# Patient Record
Sex: FEMALE | Race: WHITE | NOT HISPANIC OR LATINO | Employment: FULL TIME | URBAN - METROPOLITAN AREA
[De-identification: names, ages, dates, MRNs, and addresses within clinical notes are randomized per-mention and may not be internally consistent; named-entity substitution may affect disease eponyms.]

---

## 2019-10-02 ENCOUNTER — OFFICE VISIT (OUTPATIENT)
Dept: ENDOCRINOLOGY | Facility: MEDICAL CENTER | Age: 74
End: 2019-10-02
Payer: MEDICARE

## 2019-10-02 ENCOUNTER — HOSPITAL ENCOUNTER (OUTPATIENT)
Dept: LAB | Facility: MEDICAL CENTER | Age: 74
End: 2019-10-02
Attending: INTERNAL MEDICINE
Payer: MEDICARE

## 2019-10-02 VITALS
SYSTOLIC BLOOD PRESSURE: 120 MMHG | OXYGEN SATURATION: 100 % | HEART RATE: 80 BPM | DIASTOLIC BLOOD PRESSURE: 70 MMHG | HEIGHT: 63 IN | WEIGHT: 137 LBS | BODY MASS INDEX: 24.27 KG/M2

## 2019-10-02 DIAGNOSIS — R79.89 ABNORMAL THYROID BLOOD TEST: ICD-10-CM

## 2019-10-02 DIAGNOSIS — Z86.39 HISTORY OF VITAMIN D DEFICIENCY: ICD-10-CM

## 2019-10-02 DIAGNOSIS — E04.2 MULTIPLE THYROID NODULES: ICD-10-CM

## 2019-10-02 LAB
ALBUMIN SERPL BCP-MCNC: 4.6 G/DL (ref 3.2–4.9)
ALBUMIN/GLOB SERPL: 2.2 G/DL
ALP SERPL-CCNC: 54 U/L (ref 30–99)
ALT SERPL-CCNC: 22 U/L (ref 2–50)
ANION GAP SERPL CALC-SCNC: 7 MMOL/L (ref 0–11.9)
AST SERPL-CCNC: 24 U/L (ref 12–45)
BILIRUB SERPL-MCNC: 0.5 MG/DL (ref 0.1–1.5)
BUN SERPL-MCNC: 16 MG/DL (ref 8–22)
CALCIUM SERPL-MCNC: 8.9 MG/DL (ref 8.5–10.5)
CHLORIDE SERPL-SCNC: 104 MMOL/L (ref 96–112)
CO2 SERPL-SCNC: 27 MMOL/L (ref 20–33)
CREAT SERPL-MCNC: 0.76 MG/DL (ref 0.5–1.4)
GLOBULIN SER CALC-MCNC: 2.1 G/DL (ref 1.9–3.5)
GLUCOSE SERPL-MCNC: 79 MG/DL (ref 65–99)
POTASSIUM SERPL-SCNC: 3.9 MMOL/L (ref 3.6–5.5)
PROT SERPL-MCNC: 6.7 G/DL (ref 6–8.2)
SODIUM SERPL-SCNC: 138 MMOL/L (ref 135–145)

## 2019-10-02 PROCEDURE — 84481 FREE ASSAY (FT-3): CPT

## 2019-10-02 PROCEDURE — 83520 IMMUNOASSAY QUANT NOS NONAB: CPT

## 2019-10-02 PROCEDURE — 80053 COMPREHEN METABOLIC PANEL: CPT

## 2019-10-02 PROCEDURE — 84445 ASSAY OF TSI GLOBULIN: CPT

## 2019-10-02 PROCEDURE — 84443 ASSAY THYROID STIM HORMONE: CPT

## 2019-10-02 PROCEDURE — 36415 COLL VENOUS BLD VENIPUNCTURE: CPT

## 2019-10-02 PROCEDURE — 84439 ASSAY OF FREE THYROXINE: CPT

## 2019-10-02 PROCEDURE — 99204 OFFICE O/P NEW MOD 45 MIN: CPT | Performed by: INTERNAL MEDICINE

## 2019-10-02 RX ORDER — PANTOPRAZOLE SODIUM 40 MG/1
40 TABLET, DELAYED RELEASE ORAL DAILY
COMMUNITY
End: 2024-02-23

## 2019-10-02 RX ORDER — PRAVASTATIN SODIUM 20 MG
20 TABLET ORAL NIGHTLY
COMMUNITY

## 2019-10-02 SDOH — HEALTH STABILITY: MENTAL HEALTH: HOW OFTEN DO YOU HAVE A DRINK CONTAINING ALCOHOL?: 4 OR MORE TIMES A WEEK

## 2019-10-02 NOTE — PATIENT INSTRUCTIONS
Notify me for any new enlargement or growth in neck area, difficulty swallowing, or breathing.

## 2019-10-02 NOTE — PROGRESS NOTES
Chief Complaint: Consult requested by Bryan Pantoja DO for evaluation of abnormal thyroid function tests and history of multiple thyroid nodules and history of vitamin D deficiency    HPI:     Daria Yanes is a 74 y.o. female with history of the above medical issues.  She was discovered to have abnormal thyroid function tests on routine lab work.  Her TSH was normal 2.54 and free T4 was normal at 0.96 but her free T3 was elevated at 5.4 on June 24, 2019.  Her thyroid function tests have not been repeated since that time.  She denies symptoms of hyperthyroidism however such as unexplained weight loss, tremors, palpitations, insomnia, diarrhea and heat intolerance.    Her primary care physician back then Dr. Demetria Duncan,  after discovering the abnormal blood test ordered additional work-up and schedule her for a thyroid ultrasound    Her ultrasound showed 2 nonsuspicious and benign-appearing subcentimeter nodules both measuring 6 mm located on the left lower lobe.  She denies a family history of thyroid cancer and denies a history of radiation exposure to the head and neck.    Aside from the abnormal thyroid function tests and abnormal thyroid ultrasound she has a history of vitamin D deficiency and she is taking unrecalled supplements and her last 25 hydroxy vitamin D level was adequate at 35 on June 2019.    Unfortunately she informed me today that she has a new primary care physician Dr. Pantoja.  Her previous primary care physician has closed down her practice.    Patient's medications, allergies, and social histories were reviewed and updated as appropriate.      ROS:     CONS:     No fever, no chills, no weight loss, no fatigue   EYES:      No diplopia, no blurry vision, no redness of eyes, no swelling of eyelids   ENT:    No hearing loss, No ear pain, No sore throat, no dysphagia, no neck swelling   CV:     No chest pain, no palpitations, no claudication, no orthopnea, no PND   PULM:    No SOB, no cough, no  hemoptysis, no wheezing    GI:   No nausea, no vomiting, no diarrhea, no constipation, no bloody stools   :  Passing urine well, no dysuria, no hematuria   ENDO:   No polyuria, no polydipsia, no heat intolerance, no cold intolerance   NEURO: No headaches, no dizziness, no convulsions, no tremors   MUSC:  No joint swellings, no arthralgias, no myalgias, no weakness   SKIN:   No rash, no ulcers, no dry skin   PSYCH:   No depression, no anxiety, no difficulty sleeping       Past Medical History:  There are no active problems to display for this patient.      Past Surgical History:  Past Surgical History:   Procedure Laterality Date   • PIP ARTHRODESIS  9/25/08    Performed by LIZZY RODRIGUEZ at [facility] ORS   • MASS EXCISION ORTHO  9/25/08    Performed by LIZZY RODRIGUEZ at [facility] ORS   • JOINT INJECTION DIAGNOSTIC  9/25/08    Performed by LIZZY RODRIGUEZ at [facility] ORS   • ABDOMINAL HYSTERECTOMY TOTAL     • APPENDECTOMY     • EYE SURGERY     • GYN SURGERY      hysterectomy   • OTHER      appendectomy, tonsilectomy        Allergies:  Penicillins and Tape     Current Medications:    Current Outpatient Medications:   •  pantoprazole (PROTONIX) 40 MG Tablet Delayed Response, Take 40 mg by mouth every day., Disp: , Rfl:   •  pravastatin (PRAVACHOL) 20 MG Tab, Take 20 mg by mouth every evening., Disp: , Rfl:   •  Magnesium Citrate 200 MG Tab, Take  by mouth., Disp: , Rfl:   •  TURMERIC PO, Take  by mouth., Disp: , Rfl:   •  CALCIUM + D PO, Every Day Every Day. , Disp: , Rfl:   •  ASPIRIN 81 MG PO TABS, Every Day Every Day. , Disp: , Rfl:   •  ESTRACE PO, every 48 hours. , Disp: , Rfl:   •  FLAX SEED OIL PO, every 48 hours. , Disp: , Rfl:     Social History:  Social History     Socioeconomic History   • Marital status: Single     Spouse name: Not on file   • Number of children: Not on file   • Years of education: Not on file   • Highest education level: Not on file  "  Occupational History   • Not on file   Social Needs   • Financial resource strain: Not on file   • Food insecurity:     Worry: Not on file     Inability: Not on file   • Transportation needs:     Medical: Not on file     Non-medical: Not on file   Tobacco Use   • Smoking status: Not on file   Substance and Sexual Activity   • Alcohol use: Not on file   • Drug use: Not on file   • Sexual activity: Not on file   Lifestyle   • Physical activity:     Days per week: Not on file     Minutes per session: Not on file   • Stress: Not on file   Relationships   • Social connections:     Talks on phone: Not on file     Gets together: Not on file     Attends Jewish service: Not on file     Active member of club or organization: Not on file     Attends meetings of clubs or organizations: Not on file     Relationship status: Not on file   • Intimate partner violence:     Fear of current or ex partner: Not on file     Emotionally abused: Not on file     Physically abused: Not on file     Forced sexual activity: Not on file   Other Topics Concern   • Not on file   Social History Narrative   • Not on file        Family History:   Family History   Problem Relation Age of Onset   • Dementia Mother    • Dementia Father          PHYSICAL EXAM:   Vital signs: /70 (BP Location: Left arm, Patient Position: Sitting)   Pulse 80   Ht 1.6 m (5' 3\")   Wt 62.1 kg (137 lb)   SpO2 100%   BMI 24.27 kg/m²   GENERAL: Well-developed, well-nourished  in no apparent distress.   HEENT: Normocephalic, atraumatic. PERRL. Anicteric. Pink, moist mucous membranes. No exophthalmos or lid lag  NECK: Supple. Trachea midline. thyroid is normal in size without nodules or tenderness  CARDIOVASCULAR: Regular rate and rhythm. No murmurs, rubs, or gallops.   LUNGS: Clear to auscultation bilaterally   ABDOMEN: Soft, nontender with positive bowel sounds.   EXTREMITIES: No clubbing, cyanosis, or edema.   NEUROLOGICAL: Cranial nerves II-XII are grossly " intact   Symmetric reflexes at the patella no proximal muscle weakness, No visible tremor with both outstretched hands  LYMPH: No cervical, supraclavicular,  adenopathy palpated.   SKIN: No rashes, lesions. Turgor is normal.    Labs:  Lab Results   Component Value Date/Time    WBC 6.9 09/25/2008 10:40 AM    RBC 4.25 09/25/2008 10:40 AM    HEMOGLOBIN 14.6 09/25/2008 10:40 AM    .6 (H) 09/25/2008 10:40 AM    MCH 34.4 (H) 09/25/2008 10:40 AM    MCHC 34.1 09/25/2008 10:40 AM    RDW 12.6 09/25/2008 10:40 AM    MPV 7.6 09/25/2008 10:40 AM     Please see scanned labs from [facility] dated June 25 2019    Imaging:    Please see scanned ultrasound in the media tab from [facility] dated June 25, 2019    ASSESSMENT/PLAN:     1. Abnormal thyroid blood test  She has a history of an isolated free T3 elevation but does not have symptoms of hyperthyroidism and overall she is asymptomatic.  I explained to the patient that her free T3 elevation could be secondary to medications or an issue with the lab assay.    I recommend repeating her thyroid function test today and I am checking a TSH, free T4 and free T3 level and I am also checking some thyroid autoantibodies associate with hyperthyroidism.  I will update her on the results of her expanded work-up and let her know if she has endogenous hyperthyroidism or not.  But overall based upon her clinical symptoms and history I have a low degree of suspicion that she has endogenous hyperthyroidism.    If her labs are truly abnormal I will see her sooner but if her labs are normal I will forward the results to her new primary care physician Dr. Pantoja and see her again in 6 months.    2. Multiple thyroid nodules  She has 2 low risk subcentimeter nodules on the left lower lobe which were incidentally discovered on ultrasound as part of the evaluation of her abnormal thyroid function test.  I reviewed the risk of malignancy with  thyroid nodules and the risk of growth.  Overall these nodules appear to be low risk and biopsy is not indicated.  I recommend observation and repeating her ultrasound again after 6 to 12 months.    I explain to the patient in the future we will be obtaining an ultrasound device in the office and I plan to be able to monitor her thyroid nodules here in the office in the future    3. History of vitamin D deficiency  Controlled, her last vitamin D was fair at 35 I recommend that she continue taking vitamin D3 2000 units daily for maintenance I will repeat her 25 hydroxy vitamin D levels and calcium levels in 6 months    Return in about 6 months (around 4/2/2020).       Thank you kindly for allowing me to participate in the thyroid care plan for this patient.    Tej Ji MD, FACE, ECNU  10/02/19    CC:   Pcp Pt States None

## 2019-10-03 LAB
T3FREE SERPL-MCNC: 3.84 PG/ML (ref 2.4–4.2)
T4 FREE SERPL-MCNC: 0.9 NG/DL (ref 0.53–1.43)
TSH SERPL DL<=0.005 MIU/L-ACNC: 1.71 UIU/ML (ref 0.38–5.33)

## 2019-10-04 LAB — TSH RECEP AB SER-ACNC: <0.9 IU/L

## 2019-10-05 LAB — TSI ACT/NOR SER: 101 %

## 2020-04-02 ENCOUNTER — APPOINTMENT (OUTPATIENT)
Dept: ENDOCRINOLOGY | Facility: MEDICAL CENTER | Age: 75
End: 2020-04-02
Payer: MEDICARE

## 2023-10-03 PROBLEM — M19.042 ARTHRITIS OF LEFT HAND: Status: ACTIVE | Noted: 2023-10-03

## 2024-02-08 PROBLEM — M18.12 ARTHRITIS OF CARPOMETACARPAL (CMC) JOINT OF LEFT THUMB: Status: ACTIVE | Noted: 2024-02-08
